# Patient Record
Sex: FEMALE | Race: WHITE | URBAN - NONMETROPOLITAN AREA
[De-identification: names, ages, dates, MRNs, and addresses within clinical notes are randomized per-mention and may not be internally consistent; named-entity substitution may affect disease eponyms.]

---

## 2020-12-29 ENCOUNTER — OFFICE VISIT (OUTPATIENT)
Dept: URBAN - NONMETROPOLITAN AREA CLINIC 4 | Facility: CLINIC | Age: 35
End: 2020-12-29

## 2021-03-09 ENCOUNTER — OFFICE VISIT (OUTPATIENT)
Dept: URBAN - NONMETROPOLITAN AREA CLINIC 4 | Facility: CLINIC | Age: 36
End: 2021-03-09

## 2021-03-11 ENCOUNTER — WEB ENCOUNTER (OUTPATIENT)
Dept: URBAN - NONMETROPOLITAN AREA CLINIC 4 | Facility: CLINIC | Age: 36
End: 2021-03-11

## 2021-03-11 ENCOUNTER — OFFICE VISIT (OUTPATIENT)
Dept: URBAN - NONMETROPOLITAN AREA CLINIC 4 | Facility: CLINIC | Age: 36
End: 2021-03-11
Payer: COMMERCIAL

## 2021-03-11 DIAGNOSIS — R11.0 NAUSEA: ICD-10-CM

## 2021-03-11 DIAGNOSIS — K58.9 IBS (IRRITABLE BOWEL SYNDROME): ICD-10-CM

## 2021-03-11 DIAGNOSIS — R14.0 BLOATING: ICD-10-CM

## 2021-03-11 DIAGNOSIS — R16.0 HEPATOMEGALY: ICD-10-CM

## 2021-03-11 PROCEDURE — 99214 OFFICE O/P EST MOD 30 MIN: CPT | Performed by: INTERNAL MEDICINE

## 2021-03-11 RX ORDER — PROMETHAZINE HYDROCHLORIDE 12.5 MG/1
1 TABLET AS NEEDED TABLET ORAL EVERY 24 HOURS
Qty: 30 | Refills: 6 | OUTPATIENT
End: 2021-10-07

## 2021-03-11 RX ORDER — ONDANSETRON HYDROCHLORIDE 8 MG/1
1 TABLET AS NEEDED TABLET, FILM COATED ORAL ONCE A DAY
Qty: 30 TABLET | Refills: 2 | OUTPATIENT
Start: 2021-03-11

## 2021-03-11 RX ORDER — OMEPRAZOLE 40 MG/1
1 CAPSULE 30 MINUTES BEFORE MORNING MEAL CAPSULE, DELAYED RELEASE PELLETS ORAL ONCE A DAY
Qty: 90 | Refills: 2 | OUTPATIENT
Start: 2021-03-11

## 2021-03-11 NOTE — HPI-OTHER HISTORIES
EGD without explanation of worsening gerd/n/v and abd pain.  Pt reports that she is having pain in the RUQ. Pt with fatty liver as well with no significant advanced pathology. Pt with neg h pylori.  Pt reprots that she has had to quit meat and go to vegetable. Spicy foods make symptoms worse.  Pt reports that she has to take gerd and nausea medication.  Pt reports that she is having pain after eating. Pt reports that she starts gasping for air. Pt reports that she is feeling a knot. Pt reports that she has gained a lot of weight.

## 2021-03-11 NOTE — PHYSICAL EXAM GASTROINTESTINAL
Abdomen , soft, nontender, nondistended , no guarding or rigidity , no masses palpable , normal bowel sounds , Liver and Spleen hepatomegaly present , no hepatosplenomegaly , liver nontender , spleen not palpable

## 2021-03-29 ENCOUNTER — DASHBOARD ENCOUNTERS (OUTPATIENT)
Age: 36
End: 2021-03-29

## 2021-04-06 ENCOUNTER — OFFICE VISIT (OUTPATIENT)
Dept: URBAN - NONMETROPOLITAN AREA CLINIC 4 | Facility: CLINIC | Age: 36
End: 2021-04-06

## 2021-04-06 RX ORDER — ONDANSETRON HYDROCHLORIDE 8 MG/1
1 TABLET AS NEEDED TABLET, FILM COATED ORAL ONCE A DAY
Qty: 30 TABLET | Refills: 2 | COMMUNITY

## 2021-04-06 RX ORDER — PROMETHAZINE HYDROCHLORIDE 12.5 MG/1
1 TABLET AS NEEDED TABLET ORAL EVERY 24 HOURS
Qty: 30 | Refills: 6 | COMMUNITY

## 2021-04-06 RX ORDER — OMEPRAZOLE 40 MG/1
1 CAPSULE 30 MINUTES BEFORE MORNING MEAL CAPSULE, DELAYED RELEASE PELLETS ORAL ONCE A DAY
Qty: 90 | Refills: 2 | COMMUNITY